# Patient Record
Sex: MALE | Race: WHITE
[De-identification: names, ages, dates, MRNs, and addresses within clinical notes are randomized per-mention and may not be internally consistent; named-entity substitution may affect disease eponyms.]

---

## 2018-04-14 ENCOUNTER — HOSPITAL ENCOUNTER (INPATIENT)
Dept: HOSPITAL 53 - M ED | Age: 33
LOS: 9 days | Discharge: HOME | DRG: 753 | End: 2018-04-23
Attending: PSYCHIATRY & NEUROLOGY | Admitting: PSYCHIATRY & NEUROLOGY
Payer: COMMERCIAL

## 2018-04-14 DIAGNOSIS — F41.1: ICD-10-CM

## 2018-04-14 DIAGNOSIS — R45.851: ICD-10-CM

## 2018-04-14 DIAGNOSIS — Z79.899: ICD-10-CM

## 2018-04-14 DIAGNOSIS — F11.10: ICD-10-CM

## 2018-04-14 DIAGNOSIS — F43.10: ICD-10-CM

## 2018-04-14 DIAGNOSIS — F17.210: ICD-10-CM

## 2018-04-14 DIAGNOSIS — F10.94: ICD-10-CM

## 2018-04-14 DIAGNOSIS — F31.81: Primary | ICD-10-CM

## 2018-04-14 RX ADMIN — NICOTINE 1 PATCH: 21 PATCH, EXTENDED RELEASE TRANSDERMAL at 15:34

## 2018-04-14 RX ADMIN — CITALOPRAM HYDROBROMIDE 1 MG: 20 TABLET ORAL at 15:34

## 2018-04-14 RX ADMIN — BUPROPION HYDROCHLORIDE 1 MG: 150 TABLET, FILM COATED, EXTENDED RELEASE ORAL at 15:34

## 2018-04-14 RX ADMIN — BUPRENORPHINE AND NALOXONE 1 TAB: 2; .5 TABLET SUBLINGUAL at 20:11

## 2018-04-15 RX ADMIN — BUPROPION HYDROCHLORIDE 1 MG: 150 TABLET, FILM COATED, EXTENDED RELEASE ORAL at 08:21

## 2018-04-15 RX ADMIN — BUPRENORPHINE AND NALOXONE 1 TAB: 2; .5 TABLET SUBLINGUAL at 20:40

## 2018-04-15 RX ADMIN — BUPRENORPHINE AND NALOXONE 1 TAB: 2; .5 TABLET SUBLINGUAL at 08:21

## 2018-04-15 RX ADMIN — CITALOPRAM HYDROBROMIDE 1 MG: 20 TABLET ORAL at 08:21

## 2018-04-15 RX ADMIN — NICOTINE 1 PATCH: 21 PATCH, EXTENDED RELEASE TRANSDERMAL at 08:21

## 2018-04-16 RX ADMIN — NICOTINE 1 PATCH: 21 PATCH, EXTENDED RELEASE TRANSDERMAL at 08:36

## 2018-04-16 RX ADMIN — BUPRENORPHINE AND NALOXONE 1 TAB: 2; .5 TABLET SUBLINGUAL at 09:34

## 2018-04-16 RX ADMIN — CITALOPRAM HYDROBROMIDE 1 MG: 20 TABLET ORAL at 08:37

## 2018-04-16 RX ADMIN — BUPROPION HYDROCHLORIDE 1 MG: 150 TABLET, FILM COATED, EXTENDED RELEASE ORAL at 08:37

## 2018-04-16 RX ADMIN — BUPRENORPHINE AND NALOXONE 1 TAB: 2; .5 TABLET SUBLINGUAL at 20:09

## 2018-04-17 RX ADMIN — BUPRENORPHINE AND NALOXONE 1 TAB: 2; .5 TABLET SUBLINGUAL at 17:15

## 2018-04-17 RX ADMIN — NICOTINE 1 PATCH: 21 PATCH, EXTENDED RELEASE TRANSDERMAL at 08:45

## 2018-04-17 RX ADMIN — BUPRENORPHINE AND NALOXONE 1 TAB: 2; .5 TABLET SUBLINGUAL at 06:00

## 2018-04-17 RX ADMIN — CITALOPRAM HYDROBROMIDE 1 MG: 20 TABLET ORAL at 08:45

## 2018-04-17 RX ADMIN — COAL TAR 1 DOSE: 1.77 SHAMPOO TOPICAL at 20:58

## 2018-04-17 RX ADMIN — BUPROPION HYDROCHLORIDE 1 MG: 150 TABLET, FILM COATED, EXTENDED RELEASE ORAL at 08:45

## 2018-04-17 RX ADMIN — Medication 1 EA: at 20:58

## 2018-04-17 RX ADMIN — BUPRENORPHINE AND NALOXONE 1 TAB: 2; .5 TABLET SUBLINGUAL at 09:13

## 2018-04-18 RX ADMIN — BUPRENORPHINE AND NALOXONE 1 TAB: 2; .5 TABLET SUBLINGUAL at 17:16

## 2018-04-18 RX ADMIN — COAL TAR 1 DOSE: 1.77 SHAMPOO TOPICAL at 21:08

## 2018-04-18 RX ADMIN — CITALOPRAM HYDROBROMIDE 1 MG: 20 TABLET ORAL at 08:43

## 2018-04-18 RX ADMIN — NICOTINE 1 PATCH: 21 PATCH, EXTENDED RELEASE TRANSDERMAL at 08:43

## 2018-04-18 RX ADMIN — Medication 1 EA: at 08:44

## 2018-04-18 RX ADMIN — BUPRENORPHINE AND NALOXONE 1 TAB: 2; .5 TABLET SUBLINGUAL at 09:33

## 2018-04-18 RX ADMIN — Medication 1 DOSE: at 21:07

## 2018-04-18 RX ADMIN — RAMELTEON 1 MG: 8 TABLET ORAL at 22:04

## 2018-04-18 RX ADMIN — BUPROPION HYDROCHLORIDE 1 MG: 150 TABLET, FILM COATED, EXTENDED RELEASE ORAL at 08:43

## 2018-04-19 LAB
ADD MORPHOLOGY?: YES
ALBUMIN/GLOBULIN RATIO: 1.15 (ref 1–1.93)
ALBUMIN: 3.8 GM/DL (ref 3.2–5.2)
ALKALINE PHOSPHATASE: 70 U/L (ref 45–117)
ALT SERPL W P-5'-P-CCNC: 22 U/L (ref 12–78)
ANION GAP: 5 MEQ/L (ref 8–16)
AST SERPL-CCNC: 14 U/L (ref 7–37)
BASO #: 0.1 10^3/UL (ref 0–0.2)
BASO %: 0.6 % (ref 0–1)
BILIRUBIN,TOTAL: 0.2 MG/DL (ref 0.2–1)
BLOOD UREA NITROGEN: 11 MG/DL (ref 7–18)
CALCIUM LEVEL: 8.4 MG/DL (ref 8.5–10.1)
CARBON DIOXIDE LEVEL: 31 MEQ/L (ref 21–32)
CHLORIDE LEVEL: 104 MEQ/L (ref 98–107)
CREATININE FOR GFR: 0.86 MG/DL (ref 0.7–1.3)
EOS #: 0.3 10^3/UL (ref 0–0.5)
EOSINOPHIL NFR BLD AUTO: 2.9 % (ref 0–3)
FT4I SERPL CALC-MCNC: 2.2 % (ref 1.4–3.8)
GFR SERPL CREATININE-BSD FRML MDRD: > 60 ML/MIN/{1.73_M2} (ref 60–?)
GLUCOSE, FASTING: 82 MG/DL (ref 70–100)
HEMATOCRIT: 55.9 % (ref 42–52)
HEMOGLOBIN: 15.4 G/DL (ref 13.5–17.5)
IMMATURE GRANULOCYTE %: 0.5 % (ref 0–3)
LYMPH #: 3 10^3/UL (ref 1.5–4.5)
LYMPH %: 34.2 % (ref 24–44)
MACROCYTOSIS: (no result)
MEAN CORPUSCULAR HEMOGLOBIN: 32.3 PG (ref 27–33)
MEAN CORPUSCULAR HGB CONC: 27.5 G/DL (ref 32–36.5)
MEAN CORPUSCULAR VOLUME: 117.2 FL (ref 80–96)
MONO #: 0.8 10^3/UL (ref 0–0.8)
MONO %: 9.2 % (ref 0–5)
NEUTROPHILS #: 4.6 10^3/UL (ref 1.8–7.7)
NEUTROPHILS %: 52.6 % (ref 36–66)
NRBC BLD AUTO-RTO: 0 % (ref 0–0)
PLATELET BLD QL SMEAR: NORMAL
PLATELET COUNT, AUTOMATED: 206 10^3/UL (ref 150–450)
POSITIVE MORPH: (no result)
POTASSIUM SERUM: 4.3 MEQ/L (ref 3.5–5.1)
RED BLOOD COUNT: 4.77 10^6/UL (ref 4.3–6.1)
RED CELL DISTRIBUTION WIDTH: 10.9 % (ref 11.5–14.5)
SODIUM LEVEL: 140 MEQ/L (ref 136–145)
T UPTAKE: 33 % (ref 33–40)
THYROID STIMULATING HORMONE: 5.75 UIU/ML (ref 0.36–3.74)
THYROXINE (T4): 6.6 UG/DL (ref 4.5–12)
TOTAL PROTEIN: 7.1 GM/DL (ref 6.4–8.2)
WHITE BLOOD COUNT: 8.6 10^3/UL (ref 4–10)

## 2018-04-19 RX ADMIN — RAMELTEON 1 MG: 8 TABLET ORAL at 21:16

## 2018-04-19 RX ADMIN — BUPROPION HYDROCHLORIDE 1 MG: 150 TABLET, FILM COATED, EXTENDED RELEASE ORAL at 08:52

## 2018-04-19 RX ADMIN — CITALOPRAM HYDROBROMIDE 1 MG: 20 TABLET ORAL at 08:52

## 2018-04-19 RX ADMIN — NICOTINE 1 PATCH: 21 PATCH, EXTENDED RELEASE TRANSDERMAL at 08:52

## 2018-04-19 RX ADMIN — BUPRENORPHINE AND NALOXONE 1 TAB: 2; .5 TABLET SUBLINGUAL at 20:31

## 2018-04-19 RX ADMIN — BUPRENORPHINE AND NALOXONE 1 TAB: 2; .5 TABLET SUBLINGUAL at 08:52

## 2018-04-20 LAB — HCV AB SER QL: 0 INDEX (ref ?–0.8)

## 2018-04-20 RX ADMIN — CITALOPRAM HYDROBROMIDE 1 MG: 20 TABLET ORAL at 09:20

## 2018-04-20 RX ADMIN — NICOTINE 1 PATCH: 21 PATCH, EXTENDED RELEASE TRANSDERMAL at 09:20

## 2018-04-20 RX ADMIN — BUPROPION HYDROCHLORIDE 1 MG: 150 TABLET, FILM COATED, EXTENDED RELEASE ORAL at 09:20

## 2018-04-20 RX ADMIN — BUPRENORPHINE AND NALOXONE 1 TAB: 2; .5 TABLET SUBLINGUAL at 18:01

## 2018-04-20 RX ADMIN — RAMELTEON 1 MG: 8 TABLET ORAL at 22:01

## 2018-04-20 RX ADMIN — BUPRENORPHINE AND NALOXONE 1 TAB: 2; .5 TABLET SUBLINGUAL at 09:20

## 2018-04-21 RX ADMIN — BUPRENORPHINE AND NALOXONE 1 TAB: 2; .5 TABLET SUBLINGUAL at 09:07

## 2018-04-21 RX ADMIN — RAMELTEON 1 MG: 8 TABLET ORAL at 22:06

## 2018-04-21 RX ADMIN — MAGNESIUM HYDROXIDE 1 ML: 400 SUSPENSION ORAL at 11:50

## 2018-04-21 RX ADMIN — CITALOPRAM HYDROBROMIDE 1 MG: 20 TABLET ORAL at 08:21

## 2018-04-21 RX ADMIN — BUPROPION HYDROCHLORIDE 1 MG: 150 TABLET, FILM COATED, EXTENDED RELEASE ORAL at 08:21

## 2018-04-21 RX ADMIN — BUPRENORPHINE AND NALOXONE 1 TAB: 2; .5 TABLET SUBLINGUAL at 17:03

## 2018-04-21 RX ADMIN — NICOTINE 1 PATCH: 21 PATCH, EXTENDED RELEASE TRANSDERMAL at 08:22

## 2018-04-22 RX ADMIN — BUPROPION HYDROCHLORIDE 1 MG: 150 TABLET, FILM COATED, EXTENDED RELEASE ORAL at 08:44

## 2018-04-22 RX ADMIN — BISACODYL 1 MG: 5 TABLET, COATED ORAL at 11:43

## 2018-04-22 RX ADMIN — ACETAMINOPHEN 1 MG: 325 TABLET ORAL at 08:45

## 2018-04-22 RX ADMIN — BUPRENORPHINE AND NALOXONE 1 TAB: 2; .5 TABLET SUBLINGUAL at 17:08

## 2018-04-22 RX ADMIN — NICOTINE 1 PATCH: 21 PATCH, EXTENDED RELEASE TRANSDERMAL at 08:44

## 2018-04-22 RX ADMIN — COAL TAR 1 DOSE: 1.77 SHAMPOO TOPICAL at 17:11

## 2018-04-22 RX ADMIN — CITALOPRAM HYDROBROMIDE 1 MG: 20 TABLET ORAL at 08:44

## 2018-04-22 RX ADMIN — BUPRENORPHINE AND NALOXONE 1 TAB: 2; .5 TABLET SUBLINGUAL at 09:18

## 2018-04-22 RX ADMIN — Medication 1 DOSE: at 13:40

## 2018-04-22 RX ADMIN — ACETAMINOPHEN 1 MG: 325 TABLET ORAL at 22:08

## 2018-04-22 RX ADMIN — RAMELTEON 1 MG: 8 TABLET ORAL at 22:56

## 2018-04-23 RX ADMIN — BUPRENORPHINE AND NALOXONE 1 TAB: 2; .5 TABLET SUBLINGUAL at 06:39

## 2018-04-23 RX ADMIN — CITALOPRAM HYDROBROMIDE 1 MG: 20 TABLET ORAL at 06:39

## 2018-04-23 RX ADMIN — BUPROPION HYDROCHLORIDE 1 MG: 150 TABLET, FILM COATED, EXTENDED RELEASE ORAL at 06:38

## 2018-04-23 RX ADMIN — NICOTINE 1 PATCH: 21 PATCH, EXTENDED RELEASE TRANSDERMAL at 06:42

## 2020-12-26 ENCOUNTER — HOSPITAL ENCOUNTER (INPATIENT)
Dept: HOSPITAL 53 - M ED | Age: 35
LOS: 5 days | Discharge: HOME | DRG: 753 | End: 2020-12-31
Attending: PSYCHIATRY & NEUROLOGY | Admitting: PSYCHIATRY & NEUROLOGY
Payer: COMMERCIAL

## 2020-12-26 VITALS — HEIGHT: 68 IN | WEIGHT: 157.41 LBS | BODY MASS INDEX: 23.86 KG/M2

## 2020-12-26 VITALS — SYSTOLIC BLOOD PRESSURE: 135 MMHG | DIASTOLIC BLOOD PRESSURE: 85 MMHG

## 2020-12-26 DIAGNOSIS — F10.10: ICD-10-CM

## 2020-12-26 DIAGNOSIS — F41.1: ICD-10-CM

## 2020-12-26 DIAGNOSIS — R45.851: ICD-10-CM

## 2020-12-26 DIAGNOSIS — F31.81: Primary | ICD-10-CM

## 2020-12-26 DIAGNOSIS — F17.200: ICD-10-CM

## 2020-12-26 RX ADMIN — MULTIPLE VITAMINS W/ MINERALS TAB SCH TAB: TAB at 21:26

## 2020-12-26 RX ADMIN — NICOTINE SCH PATCH: 21 PATCH, EXTENDED RELEASE TRANSDERMAL at 21:26

## 2020-12-26 RX ADMIN — FOLIC ACID SCH MG: 1 TABLET ORAL at 21:26

## 2020-12-26 RX ADMIN — Medication SCH MG: at 21:58

## 2020-12-27 VITALS — SYSTOLIC BLOOD PRESSURE: 133 MMHG | DIASTOLIC BLOOD PRESSURE: 82 MMHG

## 2020-12-27 VITALS — DIASTOLIC BLOOD PRESSURE: 75 MMHG | SYSTOLIC BLOOD PRESSURE: 125 MMHG

## 2020-12-27 VITALS — SYSTOLIC BLOOD PRESSURE: 128 MMHG | DIASTOLIC BLOOD PRESSURE: 75 MMHG

## 2020-12-27 VITALS — SYSTOLIC BLOOD PRESSURE: 135 MMHG | DIASTOLIC BLOOD PRESSURE: 85 MMHG

## 2020-12-27 VITALS — DIASTOLIC BLOOD PRESSURE: 85 MMHG | SYSTOLIC BLOOD PRESSURE: 135 MMHG

## 2020-12-27 VITALS — SYSTOLIC BLOOD PRESSURE: 168 MMHG | DIASTOLIC BLOOD PRESSURE: 83 MMHG

## 2020-12-27 RX ADMIN — NICOTINE SCH PATCH: 21 PATCH, EXTENDED RELEASE TRANSDERMAL at 08:08

## 2020-12-27 RX ADMIN — MULTIPLE VITAMINS W/ MINERALS TAB SCH TAB: TAB at 09:00

## 2020-12-27 RX ADMIN — Medication SCH MG: at 08:08

## 2020-12-27 RX ADMIN — Medication SCH MG: at 20:23

## 2020-12-27 RX ADMIN — FOLIC ACID SCH MG: 1 TABLET ORAL at 08:08

## 2020-12-27 NOTE — HPEPDOC
General


Date of Admission


Dec 26, 2020 at 18:20


Date of Service:  Dec 27, 2020


Chief Complaint


The patient is a 35-year-old male admitted with a reason for visit of 

Unspecified Depressive Disorder.


Source:  Patient


Exam Limitations:  No limitations


Timing/Duration:  Week(s)


Severity:  Mild





History of Present Illness


Patient is 35 years old male with past history of bipolar disorder, depression, 

anxiety presented to the hospital with suicidal ideation. Patient stated that he

was under significant emotional distress this year and he broke up with his 

girlfriend. After that he wanted to kill himself. Patient denies fever, chills, 

chest pain, palpitations, nausea, vomiting, diarrhea or dysuria





Home Medications


No Active Prescriptions or Reported Meds





Allergies


Coded Allergies:  


     No Known Allergies (Unverified , 4/14/18)





Past Medical History


Medical History


Depression, bipolar, anxiety





Family History


Mother has depression





Social History


* Smoker:  current smoker


Alcohol:  Denies


Drugs:  IV drug use (in the past), other





A-FIB/CHADSVASC


A-FIB History


Current/History of A-Fib/PAF?:  No


Current PO Anticoag Therapy:  No





Review of Systems


Constitutional:  Denies: Chills, Fever


Eyes:  Denies: Pain


ENT:  Denies: Head Aches


Skin:  Denies: Rash


Pulmonary:  Denies: Dyspnea, Cough


Cardiovascular:  Denies: Chest Pain


Gastrointestinal:  Denies: Nausea, Vomiting


Genitourinary:  Denies: Dysuria


Hematologic:  Denies: Bruising


Endocrine:  Denies: Polydipsia


Musculoskeletal:  Denies: Neck Pain


Neurological:  Denies: Weakness


Psych:  Reports: Anxiety, Depression





Physical Examination


General Exam:  Positive: Alert, Cooperative


Eye Exam:  Positive: PERRLA


ENT Exam:  Positive: Atraumatic


Neck Exam:  Positive: Supple; 


   Negative: JVD


Chest Exam:  Positive: Clear to auscultation


Heart Exam:  Positive: Rate Normal


Telemetry:  Positive: No significant arrhythmia


Abdomen Exam:  Positive: Normal bowel sounds


Extremity Exam:  Negative: Clubbing, Cyanosis


Skin Exam:  Negative: Nl turgor and temperature


Neuro Exam:  Positive: Strength at 5/5 X4 ext


Psych Exam:  Positive: Anxiety, Oriented x 3; 


   Negative: Mental status NL





Vital Signs





Vital Signs








  Date Time  Temp Pulse Resp B/P (MAP) Pulse Ox O2 Delivery O2 Flow Rate FiO2


 


12/27/20 14:45 99.0 98 16 125/75 (92) 100 Room Air  











 Assessment/Plan


Patient is 35 years old male with past history of bipolar disorder, depression, 

anxiety presented to the hospital with suicidal ideation. Patient stated that he

was under significant emotional distress this year and he broke up with his 

girlfriend. After that he wanted to kill himself. Patient denies fever, chills, 

chest pain, palpitations, nausea, vomiting, diarrhea or dysuria





Problems





(1) Depression with suicidal ideation


Status:  Acute


Problem Text:  Defer treatment to psych team








Plan / VTE


VTE Prophylaxis Ordered?:  No


VTE Exclusion Mechanical Proph:  Low Risk for VTE











SUSAN JIMENEZ DO            Dec 27, 2020 17:46

## 2020-12-28 VITALS — DIASTOLIC BLOOD PRESSURE: 71 MMHG | SYSTOLIC BLOOD PRESSURE: 121 MMHG

## 2020-12-28 VITALS — DIASTOLIC BLOOD PRESSURE: 60 MMHG | SYSTOLIC BLOOD PRESSURE: 115 MMHG

## 2020-12-28 VITALS — SYSTOLIC BLOOD PRESSURE: 121 MMHG | DIASTOLIC BLOOD PRESSURE: 71 MMHG

## 2020-12-28 VITALS — SYSTOLIC BLOOD PRESSURE: 115 MMHG | DIASTOLIC BLOOD PRESSURE: 60 MMHG

## 2020-12-28 RX ADMIN — Medication SCH MG: at 20:07

## 2020-12-28 RX ADMIN — Medication SCH MG: at 08:04

## 2020-12-28 RX ADMIN — FOLIC ACID SCH MG: 1 TABLET ORAL at 08:04

## 2020-12-28 RX ADMIN — NICOTINE SCH PATCH: 21 PATCH, EXTENDED RELEASE TRANSDERMAL at 08:04

## 2020-12-28 RX ADMIN — MULTIPLE VITAMINS W/ MINERALS TAB SCH TAB: TAB at 08:04

## 2020-12-28 NOTE — MHIPNPDOC
[facility] Progress Note


Progress Note


DATE OF SERVICE: 12/28/20





HISTORY: Pt is a 35 year old Single, Unemployed, Undomiciled,  Male who

was transferred from [facility], where he had been taken a few 

days ago on a 9.41 after he had barricaded himself in his mother's place.  Says 

they had a dispute, he is somewhat vague about the dispute, but that it was to 

do with his emotional state as well, he has been depressed for the better part 

of the last couple of months,. He says they have had a difficult relationship, 

he and his mother, and for the better part of this month he has been thinking of

killing himself. He has thought of various ways, including placing stones in a 

bag, like a backpack, and then drowning himself. He says that if the police had 

not been called the other day, it was likely that he would have hung himself.  

He reports a tumultuous relationship and subsequent break up  with a woman he 

met through mutual friend earlier this year.  He returned to the area and was 

living with his mother, with whom he has had a strained relationship.  He admits

to having bipolar symptoms and feels that these symptoms coupled with his 

drinking may have contributed to the demise of his relationship with his 

girlfriend. 





VITAL SIGNS: See below.








CURRENT MEDICATIONS: See below.





MENTAL STATUS EXAMINATION:


Pt is a 35 year old Single, Unemployed, Undomiciled,  Male who was 

transferred from [facility], where he had been taken a few days 

ago on a 9.41 after he had barricaded himself in his mother's place.  He had 

reported to be suicidal with a plan to hang or drown himself. He appears mildly 

older than his stated age, alert and oriented and makes good eye contact.  He is

not observed with any psychomotor agitation or retardation. 


Speech: Is fluid, conversant, normal rate, tone and volume


Language skills are intact


Thought processes including: linear and goal oriented


Thought content: reports depression and anxiety. Mild suicidal ideation, with no

planning or intent. Abstract reasoning, and computation: fair  


Description of associations: denies, none observed


Description of abnormal or psychotic thoughts: denies, none observed.


Judgment: fair


Insight: fair


Orientation: alert and oriented to person, place, time and situation


Recent and remote memory: intact


Attention span and concentration: good


Language: expansive


Fund of knowledge: average


Mood: depressed and anxious mood Affect: reactive





DIAGNOSES:


Bipolar II Disorder, per History


Alcohol Use Disorder


Alcohol Induced Depressive Disorder


Generalized Anxiety Disorder


 


ASSESSMENT: Patient scores extremely high on a generic mood disorder 

questionnaire.  He reports that he felt that he was doing well in [place] with 

his girlfriend but that there was points in the relationship where he may have 

been manic, drinking and or very difficult to deal with.  He does report 

moderate alcohol withdrawal symptoms of headaches, tactile parasthesia, and 

stomach ache.  He discussed his sadness about the demise of his relationship 

with his girlfriend, he is hopeful that there may be a reconciliation in the 

future.  Patient is quite hesitant to start medications, reporting that he likes

his creativity and artistic talents and does not want to lose these abilities.  

Discussed with the patient the benefits of medications helping him to be stable 

and less likely to be suicidal either when he is manic or depressed.  He is 

agreeable to start Depakote at lower dose and titrate slowly. 





MANAGEMENT PLAN: Start Depakote 125 mg at HS, this will be titrated to 

therapeutic levels.  Patient is apprehensive and a very low dose will be 

started. 





TIME SPENT: 40 minutes.





Vital Signs





Vital Signs








  Date Time  Temp Pulse Resp B/P (MAP) Pulse Ox O2 Delivery O2 Flow Rate FiO2


 


12/28/20 06:44 98.6 68 16 115/60 (78) 97 Room Air  











Current Medications





Current Medications








 Medications


  (Trade)  Dose


 Ordered  Sig/Rudy


 Route


 PRN Reason  Start Time


 Stop Time Status Last Admin


Dose Admin


 


 Al Hydrox/Mg


 Hydrox/Simethicone


  (Mylanta)  30 ml  Q4HP  PRN


 PO


 HEARTBURN/INDIGESTION  12/26/20 18:30


     





 


 Divalproex Sodium


  (Depakote)  125 mg  QHS


 PO


   12/28/20 21:00


     





 


 Folic Acid


  (Folic Acid)  1 mg  DAILY


 PO


   12/26/20 09:00


    12/28/20 08:04





 


 Home Med


  (Med Rec


 Complete!)    ASDIRECTED


 XX


   12/26/20 17:00


 12/26/20 17:09 DC  





 


 Ibuprofen


  (Advil)  400 mg  Q6HP  PRN


 PO


 PAIN  12/26/20 18:30


     





 


 Lorazepam


  (Ativan)  2 mg  ASDIRECTED  PRN


 PO


 SEE PROTOCOL  12/26/20 18:30


    12/27/20 18:07





 


 Magnesium


 Hydroxide


  (Milk Of


 Magnesia)  30 ml  DAILYPRN  PRN


 PO


 CONSTIPATION  12/26/20 18:30


     





 


 Multivitamins


  (Theragram-M)  1 tab  DAILY


 PO


   12/26/20 09:00


    12/28/20 08:04





 


 Nicotine


  (Nicoderm Cq


 21mg)  1 patch  DAILY


 TD


   12/26/20 09:00


    12/28/20 08:04





 


 Olanzapine


  (ZyPREXA)  5 mg  Q4HP  PRN


 PO


 ANXIETY/AGITATION  12/26/20 18:30


    12/27/20 20:23





 


 Thiamine HCl


  (Thiamine HCl)  100 mg  BID


 PO


   12/26/20 21:00


 12/29/20 20:59  12/28/20 08:04





 


 Trazodone HCl


  (Desyrel)  50 mg  QHSP  PRN


 PO


 INSOMNIA  12/26/20 18:30


    12/27/20 22:16














Allergies


Coded Allergies:  


     No Known Allergies (Unverified , 4/14/18)











ZOË LOVE NP              Dec 28, 2020 12:58

## 2020-12-29 VITALS — DIASTOLIC BLOOD PRESSURE: 57 MMHG | SYSTOLIC BLOOD PRESSURE: 108 MMHG

## 2020-12-29 VITALS — DIASTOLIC BLOOD PRESSURE: 84 MMHG | SYSTOLIC BLOOD PRESSURE: 135 MMHG

## 2020-12-29 RX ADMIN — FOLIC ACID SCH MG: 1 TABLET ORAL at 08:03

## 2020-12-29 RX ADMIN — MULTIPLE VITAMINS W/ MINERALS TAB SCH TAB: TAB at 08:02

## 2020-12-29 RX ADMIN — DIVALPROEX SODIUM SCH MG: 250 TABLET, EXTENDED RELEASE ORAL at 20:06

## 2020-12-29 RX ADMIN — Medication SCH MG: at 08:03

## 2020-12-29 RX ADMIN — NICOTINE SCH PATCH: 21 PATCH, EXTENDED RELEASE TRANSDERMAL at 08:03

## 2020-12-29 NOTE — MHIPNPDOC
[facility] Progress Note


Progress Note


DATE OF SERVICE: 12/29/20





HISTORY: Pt is a 35 year old Single, Unemployed, Undomiciled,  Male who

was transferred from [facility], where he had been taken a few 

days ago on a 9.41 after he had barricaded himself in his mother's place.  Says 

they had a dispute, he is somewhat vague about the dispute, but that it was to 

do with his emotional state as well, he has been depressed for the better part 

of the last couple of months,. He says they have had a difficult relationship, 

he and his mother, and for the better part of this month he has been thinking of

killing himself. He has thought of various ways, including placing stones in a 

bag, like a backpack, and then drowning himself. He says that if the police had 

not been called the other day, it was likely that he would have hung himself.  

He reports a tumultuous relationship and subsequent break up  with a woman he 

met through mutual friend earlier this year.  He returned to the area and was 

living with his mother, with whom he has had a strained relationship.  He admits

to having bipolar symptoms and feels that these symptoms coupled with his 

drinking may have contributed to the demise of his relationship with his 

girlfriend. 





VITAL SIGNS: See below.








CURRENT MEDICATIONS: See below.





MENTAL STATUS EXAMINATION:


Pt is a 35 year old Single, Unemployed, Undomiciled,  Male who was 

transferred from [facility], where he had been taken a few days 

ago on a 9.41 after he had barricaded himself in his mother's place.  He had 

reported to be suicidal with a plan to hang or drown himself. He appears mildly 

older than his stated age, alert and oriented and makes good eye contact.  He is

observed with mild psychomotor agitation. 


Speech: Is fluid, conversant, mildly rapid rate, tone and volume, hyperverbal, 

tangential and at times circumstantial


Language skills are intact


Thought processes including: linear and goal oriented


Thought content: reports depression and anxiety. Fleeting suicidal ideation, 

with no planning or intent. Abstract reasoning, and computation: fair  


Description of associations: denies, none observed


Description of abnormal or psychotic thoughts: denies, none observed.


Judgment: fair


Insight: fair


Orientation: alert and oriented to person, place, time and situation


Recent and remote memory: intact


Attention span and concentration: good


Language: expansive


Fund of knowledge: average


Mood: depressed and anxious mood, restless, pacing the hallway - was medicated 

with Zyprexa 5 mg for agitation Affect: reactive, restless





DIAGNOSES:


Bipolar II Disorder, per History


Alcohol Use Disorder


Alcohol Induced Depressive Disorder


Generalized Anxiety Disorder


 


ASSESSMENT: 


Patient is observed to be mildly hypomanic in speech, tangential and at times 

circumstantial - her reported that he spoke to his ex-girlfriend asking for a 

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 

into an argument.  He reported that he suspected that his girlfriend may have 

been unfaithful which may have elevated his racing moods, insomnia and labile 

moods and subsequently suicidal ideation, planning and near intent.  He was 

found pacing the floor prior to this interview, having just spoken to his 

girlfriend.  He was very animated and irritable in the interview describing his 

relationship with her.  Reporting that he feels that he has been drawn to people

who give him limited or no supports.  States that his relationship with his 

mother is always on the brink of her washing his hands of him and kicking him 

out.  He feels that the last month of this relationship felt the same as his 

relationship with his mother.  Throughout the interview, he admits that he fears

that taking medications will halt his creativity and this has been his 

resistance to medications. 





MANAGEMENT PLAN: Patient tolerated Depakote 125 mg, increase to Depakote  

mg at HS.  Due to patient's finances, Depakote prices were researched, [employer] sells Depakote  mg 60 tablets for $13.60.  Patient feels that he can

manage this. He was medicated with Zyprexa 5 mg for agitation with good effects.







TIME SPENT: 40 minutes.





Vital Signs





Vital Signs








  Date Time  Temp Pulse Resp B/P (MAP) Pulse Ox O2 Delivery O2 Flow Rate FiO2


 


12/29/20 09:11      Room Air  


 


12/29/20 06:23 97.9 58 18 108/57 (74) 99   











Current Medications





Current Medications








 Medications


  (Trade)  Dose


 Ordered  Sig/Rudy


 Route


 PRN Reason  Start Time


 Stop Time Status Last Admin


Dose Admin


 


 Al Hydrox/Mg


 Hydrox/Simethicone


  (Mylanta)  30 ml  Q4HP  PRN


 PO


 HEARTBURN/INDIGESTION  12/26/20 18:30


     





 


 Divalproex Sodium


  (Depakote)  125 mg  QHS


 PO


   12/28/20 21:00


    12/28/20 20:08





 


 Folic Acid


  (Folic Acid)  1 mg  DAILY


 PO


   12/26/20 09:00


    12/29/20 08:03





 


 Home Med


  (Med Rec


 Complete!)    ASDIRECTED


 XX


   12/26/20 17:00


 12/26/20 17:09 DC  





 


 Hydroxyzine HCl


  (Atarax)  50 mg  BID


 PO


   12/28/20 21:00


    12/29/20 08:03





 


 Ibuprofen


  (Advil)  400 mg  Q6HP  PRN


 PO


 PAIN  12/26/20 18:30


     





 


 Lorazepam


  (Ativan)  2 mg  ASDIRECTED  PRN


 PO


 SEE PROTOCOL  12/26/20 18:30


    12/27/20 18:07





 


 Magnesium


 Hydroxide


  (Milk Of


 Magnesia)  30 ml  DAILYPRN  PRN


 PO


 CONSTIPATION  12/26/20 18:30


     





 


 Multivitamins


  (Theragram-M)  1 tab  DAILY


 PO


   12/26/20 09:00


    12/29/20 08:02





 


 Nicotine


  (Nicoderm Cq


 21mg)  1 patch  DAILY


 TD


   12/26/20 09:00


    12/29/20 08:03





 


 Olanzapine


  (ZyPREXA)  5 mg  Q4HP  PRN


 PO


 ANXIETY/AGITATION  12/26/20 18:30


    12/28/20 20:08





 


 Thiamine HCl


  (Thiamine HCl)  100 mg  BID


 PO


   12/26/20 21:00


 12/29/20 20:59  12/29/20 08:03





 


 Trazodone HCl


  (Desyrel)  50 mg  QHSP  PRN


 PO


 INSOMNIA  12/26/20 18:30


    12/28/20 22:54














Allergies


Coded Allergies:  


     No Known Allergies (Unverified , 4/14/18)











ZOË LOVE NP              Dec 29, 2020 14:01

## 2020-12-30 VITALS — DIASTOLIC BLOOD PRESSURE: 79 MMHG | SYSTOLIC BLOOD PRESSURE: 134 MMHG

## 2020-12-30 VITALS — DIASTOLIC BLOOD PRESSURE: 56 MMHG | SYSTOLIC BLOOD PRESSURE: 107 MMHG

## 2020-12-30 RX ADMIN — DIVALPROEX SODIUM SCH MG: 250 TABLET, EXTENDED RELEASE ORAL at 20:06

## 2020-12-30 RX ADMIN — NICOTINE SCH PATCH: 21 PATCH, EXTENDED RELEASE TRANSDERMAL at 08:35

## 2020-12-30 RX ADMIN — MULTIPLE VITAMINS W/ MINERALS TAB SCH TAB: TAB at 08:33

## 2020-12-30 RX ADMIN — FOLIC ACID SCH MG: 1 TABLET ORAL at 08:35

## 2020-12-30 NOTE — MHIPNPDOC
[facility] Progress Note


Progress Note


DATE OF SERVICE: 12/30/20





HISTORY: Pt is a 35 year old Single, Unemployed, Undomiciled,  Male who

was transferred from [facility], where he had been taken a few 

days ago on a 9.41 after he had barricaded himself in his mother's place.  Says 

they had a dispute, he is somewhat vague about the dispute, but that it was to 

do with his emotional state as well, he has been depressed for the better part 

of the last couple of months,. He says they have had a difficult relationship, 

he and his mother, and for the better part of this month he has been thinking of

killing himself. He has thought of various ways, including placing stones in a 

bag, like a backpack, and then drowning himself. He says that if the police had 

not been called the other day, it was likely that he would have hung himself.  

He reports a tumultuous relationship and subsequent break up  with a woman he 

met through mutual friend earlier this year.  He returned to the area and was 

living with his mother, with whom he has had a strained relationship.  He admits

to having bipolar symptoms and feels that these symptoms coupled with his 

drinking may have contributed to the demise of his relationship with his 

girlfriend. 





VITAL SIGNS: See below.








CURRENT MEDICATIONS: See below.





MENTAL STATUS EXAMINATION:


Pt is a 35 year old Single, Unemployed, Undomiciled,  Male who was 

transferred from [facility], where he had been taken a few days 

ago on a 9.41 after he had barricaded himself in his mother's place.  He had 

reported to be suicidal with a plan to hang or drown himself. He appears mildly 

older than his stated age, alert and oriented and makes good eye contact.  He is

observed with mild psychomotor agitation. 


Speech: Is fluid, conversant, mildly rapid rate, tone and volume, hyperverbal, 

tangential and at times circumstantial


Language skills are intact


Thought processes including: linear and goal oriented


Thought content: reports decreased depression and anxiety. Denies suicidal 

ideation, with no planning or intent. Abstract reasoning, and computation: fair 




Description of associations: denies, none observed


Description of abnormal or psychotic thoughts: denies, none observed.


Judgment: good


Insight: good


Orientation: alert and oriented to person, place, time and situation


Recent and remote memory: intact


Attention span and concentration: good


Language: expansive


Fund of knowledge: average


Mood: euthymic mood.  Affect: Reactive





DIAGNOSES:


Bipolar II Disorder, per History


Alcohol Use Disorder


Alcohol Induced Depressive Disorder


Generalized Anxiety Disorder


 


ASSESSMENT: 


Patient states that his mood is improving on Depakote, feels that without the 

medication, he would have been quite labile and agitated yesterday.  Patient 

reports improvement in his ability to use his coping mechanisms when given 

stressful information, states that prior to his hospitalization, he would be 

very animated, grissom and labile.  Patient is speaking to his mother, he feels 

that he can be discharged tomorrow, reporting decrease in frustration, dep

ression, aggressiveness, anger and denying today that he has any suicidal 

ideation.  He states that he feels that Dialectic Behavioral Therapy is useful 

for him and would like to continue that therapy in the community.





MANAGEMENT PLAN: Patient tolerating Depakote  mg with no complaints.  

Discharge possibly tomorrow.   





TIME SPENT: 25 minutes.





Vital Signs





Vital Signs








  Date Time  Temp Pulse Resp B/P (MAP) Pulse Ox O2 Delivery O2 Flow Rate FiO2


 


12/30/20 08:33      Room Air  


 


12/30/20 05:51 97.7 60 16 107/56 (73) 96   











Current Medications





Current Medications








 Medications


  (Trade)  Dose


 Ordered  Sig/Rudy


 Route


 PRN Reason  Start Time


 Stop Time Status Last Admin


Dose Admin


 


 Al Hydrox/Mg


 Hydrox/Simethicone


  (Mylanta)  30 ml  Q4HP  PRN


 PO


 HEARTBURN/INDIGESTION  12/26/20 18:30


     





 


 Diphenhydramine


 HCl


  (Benadryl)  50 mg  QHSP  PRN


 PO


 INSOMNIA  12/30/20 09:15


     





 


 Divalproex Sodium


  (Depakote Er)  250 mg  QHS


 PO


   12/29/20 21:00


    12/29/20 20:06





 


 Divalproex Sodium


  (Depakote)  125 mg  QHS


 PO


   12/28/20 21:00


 12/29/20 13:32 DC 12/28/20 20:08





 


 Folic Acid


  (Folic Acid)  1 mg  DAILY


 PO


   12/26/20 09:00


    12/30/20 08:35





 


 Home Med


  (Med Rec


 Complete!)    ASDIRECTED


 XX


   12/26/20 17:00


 12/26/20 17:09 DC  





 


 Hydroxyzine HCl


  (Atarax)  50 mg  BID


 PO


   12/28/20 21:00


    12/30/20 08:35





 


 Ibuprofen


  (Advil)  400 mg  Q6HP  PRN


 PO


 PAIN  12/26/20 18:30


     





 


 Lorazepam


  (Ativan)  2 mg  ASDIRECTED  PRN


 PO


 SEE PROTOCOL  12/26/20 18:30


    12/27/20 18:07





 


 Magnesium


 Hydroxide


  (Milk Of


 Magnesia)  30 ml  DAILYPRN  PRN


 PO


 CONSTIPATION  12/26/20 18:30


     





 


 Multivitamins


  (Theragram-M)  1 tab  DAILY


 PO


   12/26/20 09:00


    12/30/20 08:33





 


 Nicotine


  (Nicoderm Cq


 21mg)  1 patch  DAILY


 TD


   12/26/20 09:00


    12/30/20 08:35





 


 Olanzapine


  (ZyPREXA)  5 mg  Q4HP  PRN


 PO


 ANXIETY/AGITATION  12/26/20 18:30


    12/29/20 20:05





 


 Thiamine HCl


  (Thiamine HCl)  100 mg  BID


 PO


   12/26/20 21:00


 12/29/20 20:59 DC 12/29/20 08:03





 


 Trazodone HCl


  (Desyrel)  50 mg  QHSP  PRN


 PO


 INSOMNIA  12/26/20 18:30


 12/30/20 09:05 DC 12/29/20 22:43














Allergies


Coded Allergies:  


     No Known Allergies (Unverified , 4/14/18)











ZOË LOVE NP              Dec 30, 2020 13:29

## 2020-12-31 VITALS — SYSTOLIC BLOOD PRESSURE: 108 MMHG | DIASTOLIC BLOOD PRESSURE: 56 MMHG

## 2020-12-31 RX ADMIN — NICOTINE SCH PATCH: 21 PATCH, EXTENDED RELEASE TRANSDERMAL at 08:14

## 2020-12-31 RX ADMIN — FOLIC ACID SCH MG: 1 TABLET ORAL at 08:14

## 2020-12-31 RX ADMIN — MULTIPLE VITAMINS W/ MINERALS TAB SCH TAB: TAB at 08:14
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
